# Patient Record
Sex: MALE | NOT HISPANIC OR LATINO | Employment: UNEMPLOYED | URBAN - METROPOLITAN AREA
[De-identification: names, ages, dates, MRNs, and addresses within clinical notes are randomized per-mention and may not be internally consistent; named-entity substitution may affect disease eponyms.]

---
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
## 2022-01-05 DIAGNOSIS — F84.0 AUTISM: ICD-10-CM

## 2022-01-05 DIAGNOSIS — R27.8 COORDINATION ABNORMAL: Primary | ICD-10-CM

## 2022-09-29 ENCOUNTER — OFFICE VISIT (OUTPATIENT)
Dept: PEDIATRICS | Facility: CLINIC | Age: 22
End: 2022-09-29
Payer: MEDICAID

## 2022-09-29 VITALS
DIASTOLIC BLOOD PRESSURE: 67 MMHG | WEIGHT: 141 LBS | BODY MASS INDEX: 20.19 KG/M2 | SYSTOLIC BLOOD PRESSURE: 104 MMHG | TEMPERATURE: 97.6 F | HEART RATE: 72 BPM | HEIGHT: 70 IN

## 2022-09-29 DIAGNOSIS — F84.0 AUTISM: ICD-10-CM

## 2022-09-29 DIAGNOSIS — R26.9 ABNORMAL GAIT: ICD-10-CM

## 2022-09-29 DIAGNOSIS — Z00.129 ENCOUNTER FOR ROUTINE CHILD HEALTH EXAMINATION W/O ABNORMAL FINDINGS: Primary | ICD-10-CM

## 2022-09-29 DIAGNOSIS — J30.1 SEASONAL ALLERGIC RHINITIS DUE TO POLLEN: ICD-10-CM

## 2022-09-29 PROCEDURE — 92551 PURE TONE HEARING TEST AIR: CPT | Performed by: PEDIATRICS

## 2022-09-29 PROCEDURE — 99395 PREV VISIT EST AGE 18-39: CPT | Performed by: PEDIATRICS

## 2022-09-29 RX ORDER — FEXOFENADINE HCL 180 MG/1
180 TABLET ORAL DAILY
Qty: 90 TABLET | Refills: 1 | Status: SHIPPED | OUTPATIENT
Start: 2022-09-29 | End: 2022-12-08

## 2022-09-29 SDOH — ECONOMIC STABILITY: TRANSPORTATION INSECURITY
IN THE PAST 12 MONTHS, HAS THE LACK OF TRANSPORTATION KEPT YOU FROM MEDICAL APPOINTMENTS OR FROM GETTING MEDICATIONS?: NO

## 2022-09-29 SDOH — ECONOMIC STABILITY: FOOD INSECURITY: WITHIN THE PAST 12 MONTHS, YOU WORRIED THAT YOUR FOOD WOULD RUN OUT BEFORE YOU GOT MONEY TO BUY MORE.: SOMETIMES TRUE

## 2022-09-29 SDOH — ECONOMIC STABILITY: FOOD INSECURITY: WITHIN THE PAST 12 MONTHS, THE FOOD YOU BOUGHT JUST DIDN'T LAST AND YOU DIDN'T HAVE MONEY TO GET MORE.: NEVER TRUE

## 2022-09-29 SDOH — ECONOMIC STABILITY: INCOME INSECURITY: IN THE LAST 12 MONTHS, WAS THERE A TIME WHEN YOU WERE NOT ABLE TO PAY THE MORTGAGE OR RENT ON TIME?: PATIENT REFUSED

## 2022-09-29 NOTE — PROGRESS NOTES
Preventive Care Visit  [facility]  Ethan Swain MD, Pediatrics  Sep 29, 2022    Assessment & Plan   22 year old, here for preventive care.    Nicolle was seen today for well child and health maintenance.    Diagnoses and all orders for this visit:    Encounter for routine child health examination w/o abnormal findings  -     Cancel: BEHAVIORAL/EMOTIONAL ASSESSMENT (28034)  -     SCREENING TEST, PURE TONE, AIR ONLY    Autism    Abnormal gait    Seasonal allergic rhinitis due to pollen  -     fexofenadine (ALLEGRA) 180 MG tablet; Take 1 tablet (180 mg) by mouth daily    doing well. Independent but supported in decision making with mother. Attending school and working and doing well  rx for allegra as zyrtec makes sleepy  Continue with PT for abnormal gait (likely lack of core strength/deconditioning)  Will need adult provider next year    Patient has been advised of split billing requirements and indicates understanding: Yes  Growth      Normal height and weight    Immunizations   Patient/Parent(s) declined some/all vaccines today.  HPV, covid, influenzaMenB Vaccine not discussed.    Anticipatory Guidance    Reviewed age appropriate anticipatory guidance.   Reviewed Anticipatory Guidance in patient instructions    Cleared for sports:  Not addressed    Referrals/Ongoing Specialty Care  Ongoing care with PT  Verbal Dental Referral: Verbal dental referral was given      Follow Up      Return in 1 year (on 9/29/2023) for Preventive Care visit.    Subjective     No flowsheet data found.  Social 9/29/2022   Lives with Family   Recent potential stressors None   History of trauma No   Family Hx of mental health challenges No   Lack of transportation has limited access to appts/meds No   Difficulty paying mortgage/rent on time Patient refused   Lack of steady place to sleep/has slept in a shelter No   (!) HOUSING CONCERN PRESENT  Health Risks/Safety 9/29/2022   Do you always wear a seat belt? Yes    Helmet use? (!) NO        TB Screening: Consider immunosuppression as a risk factor for TB 9/29/2022   Recent TB infection or positive TB test in family/close contacts No   Recent travel outside [place] (you/family/close contacts) (!) YES   Which country? [place]   For how long?  9 Month s   Recent residence in high-risk group setting (correctional facility/health care facility/homeless shelter/refugee camp) No     Recent Labs   Lab Test 07/30/20  0805 07/15/20  1411   CHOL 190 209*   HDL 49 56   * 140*   TRIG 52 65       Diet 9/29/2022   What type of water? (!) BOTTLED   In past 12 months, concerned food might run out Sometimes true   In past 12 months, food has run out/couldn't afford more Never true     Diet 9/29/2022   Do you have questions about your eating?  No   Do you have questions about your weight?  No   What do you regularly drink? Water, Cow's Milk, (!) MILK ALTERNATIVE (E.G. SOY, ALMOND, RIPPLE), (!) JUICE, (!) POP   What type of water? (!) BOTTLED   Do you think you eat healthy foods? Yes   At least 3 servings of food or beverages that have calcium each day? Yes   How would you describe your diet?  No restrictions, (!) FAST FOOD FREQUENTLY   In past 12 months, concerned food might run out Sometimes true   In past 12 months, food has run out/couldn't afford more Never true     (!) FOOD SECURITY CONCERN PRESENT  Activity 9/29/2022   Days per week of moderate/strenuous exercise 2 days   On average, how many minutes do you engage in exercise at this level? (!) 30 MINUTES   What do you do for exercise? At [facility]   What activities are you involved with? Swimming lessons     Media Use 9/29/2022   Hours per day of screen time (for entertainment) A minute     Sleep 9/29/2022   Do you have any trouble with sleep? No     School 9/29/2022   Are you in school? Yes   What school do you attend?  [id number]   What do you do for work? [employer]     Vision/Hearing 9/29/2022   Vision or  "hearing concerns (!) VISION CONCERNS       Psycho-Social/Depression - PSC-17 required for C&TC through age 18  General screening:  No screening tool used  Teen Screen    Teen Screen completed, reviewed and scanned document within chart.         Objective     Exam  /67   Pulse 72   Temp 97.6  F (36.4  C) (Oral)   Ht 1.777 m (5' 9.96\")   Wt 64 kg (141 lb)   BMI 20.25 kg/m    Facility age limit for growth percentiles is 20 years.  Facility age limit for growth percentiles is 20 years.  Facility age limit for growth percentiles is 20 years.  Growth percentile SmartLinks can only be used for patients less than 20 years old.    Vision Screen  Vision Screen Details  Reason Vision Screen Not Completed: Patient had exam in last 12 months    Hearing Screen  RIGHT EAR  1000 Hz on Level 40 dB (Conditioning sound): Pass  1000 Hz on Level 20 dB: Pass  2000 Hz on Level 20 dB: Pass  4000 Hz on Level 20 dB: Pass  6000 Hz on Level 20 dB: Pass  8000 Hz on Level 20 dB: Pass  LEFT EAR  8000 Hz on Level 20 dB: Pass  6000 Hz on Level 20 dB: Pass  4000 Hz on Level 20 dB: Pass  2000 Hz on Level 20 dB: Pass  1000 Hz on Level 20 dB: Pass  500 Hz on Level 25 dB: Pass  RIGHT EAR  500 Hz on Level 25 dB: Pass  Results  Hearing Screen Results: Pass      Physical Exam  GENERAL: Active, alert, in no acute distress.  SKIN: Clear. No significant rash, abnormal pigmentation or lesions  HEAD: Normocephalic  EYES: Pupils equal, round, reactive, Extraocular muscles intact. Normal conjunctivae.  EARS: Normal canals. Tympanic membranes are normal; gray and translucent.  NOSE: Normal without discharge.  MOUTH/THROAT: Clear. No oral lesions. Teeth without obvious abnormalities.  NECK: Supple, no masses.  No thyromegaly.  LYMPH NODES: No adenopathy  LUNGS: Clear. No rales, rhonchi, wheezing or retractions  HEART: Regular rhythm. Normal S1/S2. No murmurs. Normal pulses.  ABDOMEN: Soft, non-tender, not distended, no masses or hepatosplenomegaly. Bowel " sounds normal.   NEUROLOGIC: No focal findings. Cranial nerves grossly intact: DTR's normal. Normal gait, strength and tone  BACK: Spine is straight, no scoliosis.  EXTREMITIES: Full range of motion, no deformities  : Normal male external genitalia. Boris stage 5,  both testes descended, no hernia.       Musculoskeletal    Neck: normal    Back: normal    Shoulder/arm: normal    Elbow/forearm: normal    Wrist/hand/fingers: normal    Hip/thigh: normal    Knee: normal    Leg/ankle: normal    Foot/toes: normal    Functional (Single Leg Hop or Squat): difficult to perform, poor coordinatino      Ethan Swain MD  [facility]

## 2022-09-29 NOTE — PATIENT INSTRUCTIONS
Dr. Shipman ([facility])    [email or web address] [phone]  Patient Education    [employer] HANDOUT- PATIENT  18 THROUGH 21 YEAR VISITS  Here are some suggestions from [employer] experts that may be of value to your family.     HOW YOU ARE DOING  Enjoy spending time with your family.  Find activities you are really interested in, such as sports, theater, or volunteering.  Try to be responsible for your schoolwork or work obligations.  Always talk through problems and never use violence.  If you get angry with someone, try to walk away.  If you feel unsafe in your home or have been hurt by someone, let us know. Hotlines and community agencies can also provide confidential help.  Talk with us if you are worried about your living or food situation. Community agencies and programs such as SNAP can help.  Don t smoke, vape, or use drugs. Avoid people who do when you can. Talk with us if you are worried about alcohol or drug use in your family.    YOUR DAILY LIFE  Visit the dentist at least twice a year.  Brush your teeth at least twice a day and floss once a day.  Be a healthy eater.  Have vegetables, fruits, lean protein, and whole grains at meals and snacks.  Limit fatty, sugary, salty foods that are low in nutrients, such as candy, chips, and ice cream.  Eat when you re hungry. Stop when you feel satisfied.  Eat breakfast.  Drink plenty of water.  Make sure to get enough calcium every day.  Have 3 or more servings of low-fat (1%) or fat-free milk and other low-fat dairy products, such as yogurt and cheese.  Women: Make sure to eat foods rich in folate, such as fortified grains and dark- green leafy vegetables.  Aim for at least 1 hour of physical activity every day.  Wear safety equipment when you play sports.  Get enough sleep.  Talk with us about managing your health care and insurance as an adult.    YOUR FEELINGS  Most people have ups and downs. If you are feeling sad, depressed, nervous, irritable, hopeless, or  angry, let us know or reach out to another health care professional.  Figure out healthy ways to deal with stress.  Try your best to solve problems and make decisions on your own.  Sexuality is an important part of your life. If you have any questions or concerns, we are here for you.    HEALTHY BEHAVIOR CHOICES  Avoid using drugs, alcohol, tobacco, steroids, and diet pills. Support friends who choose not to use.  If you use drugs or alcohol, let us know or talk with another trusted adult about it. We can help you with quitting or cutting down on your use.  Make healthy decisions about your sexual behavior.  If you are sexually active, always practice safe sex. Always use birth control along with a condom to prevent pregnancy and sexually transmitted infections.  All sexual activity should be something you want. No one should ever force or try to convince you.  Protect your hearing at work, home, and concerts. Keep your earbud volume down.    STAYING SAFE  Always be a safe and cautious .  Insist that everyone use a lap and shoulder seat belt.  Limit the number of friends in the car and avoid driving at night.  Avoid distractions. Never text or talk on the phone while you drive.  Do not ride in a vehicle with someone who has been using drugs or alcohol.  If you feel unsafe driving or riding with someone, call someone you trust to drive you.  Wear helmets and protective gear while playing sports. Wear a helmet when riding a bike, a motorcycle, or an ATV or when skiing or skateboarding.  Always use sunscreen and a hat when you re outside.  Fighting and carrying weapons can be dangerous. Talk with your parents, teachers, or doctor about how to avoid these situations.        Consistent with Bright Futures: Guidelines for Health Supervision of Infants, Children, and Adolescents, 4th Edition  For more information, go to https://brightfutures.aap.org.

## 2022-09-29 NOTE — LETTER
[place] [id number] [facility], [place] [id number] [phone]    2022      Name: Nicolle Palencia  : 2000  [address] [place] [id number]  [phone] (home) None (work)    Parent/Guardian: Wendy Manzano and Aurelia Pritchett      Date of last physical exam: 22  Are immunizations up to date? Yes  Immunization History   Administered Date(s) Administered     COVID-19,PF,Pfizer (12+ Yrs) 2021, 2021     DTAP (<7y) 2002, 2002, 2002, 2004, 10/27/2006     DTaP, Unspecified 2002, 2002, 2002, 2004, 10/27/2006     Hep B, Peds or Adolescent 2002, 2002, 2002     HepA-Peds, Unspecified 2012, 10/03/2012     HepA-ped 2 Dose 2012, 10/03/2012     MMR 2003, 10/27/2006     MMR/V 2003, 10/27/2006     Meningococcal (Menactra ) 07/15/2020     Meningococcal (Menveo ) 2014     Meningococcal Mcv4 Conjugate,unspecified  07/15/2020     Pedvax-hib 2002, 2002, 2003     Pneumo Conj 13-V (2010&after) 2002, 2002, 2003, 2003     Poliovirus, inactivated (IPV) 2002, 2002, 2002, 10/27/2006     Tdap (Adacel,Boostrix) 2014     Varicella 2003, 2012, 2014       How long have you been seeing this child? Adolescence   How frequently do you see this child when he is not ill? 1x/year  Does this child have any allergies (including allergies to medication)? Peanuts [nuts]  Is a modified diet necessary? Yes: no nuts  Is any condition present that might result in an emergency? Yes: nut allergy  What is the status of the child's Vision? normal for age  What is the status of the child's Hearing? normal for age  What is the status of the child's Speech? normal for age  List of important health problems--indicate if you or another medical source follows:  Autism  Nut allergy  Will any health issues require special  attention at the center?  No  Other information helpful to the  program:           Ethan Swain MD

## 2022-10-04 PROBLEM — R26.9 ABNORMAL GAIT: Status: ACTIVE | Noted: 2022-10-04

## 2022-10-04 PROBLEM — J30.1 SEASONAL ALLERGIC RHINITIS DUE TO POLLEN: Status: ACTIVE | Noted: 2022-10-04

## 2022-12-08 ENCOUNTER — OFFICE VISIT (OUTPATIENT)
Dept: INTERNAL MEDICINE | Facility: CLINIC | Age: 22
End: 2022-12-08
Payer: MEDICAID

## 2022-12-08 VITALS
WEIGHT: 132.1 LBS | DIASTOLIC BLOOD PRESSURE: 56 MMHG | SYSTOLIC BLOOD PRESSURE: 92 MMHG | TEMPERATURE: 98 F | BODY MASS INDEX: 18.91 KG/M2 | RESPIRATION RATE: 16 BRPM | HEIGHT: 70 IN | HEART RATE: 88 BPM | OXYGEN SATURATION: 98 %

## 2022-12-08 DIAGNOSIS — R11.0 NAUSEA: ICD-10-CM

## 2022-12-08 DIAGNOSIS — R19.8 GAGGING EPISODE: ICD-10-CM

## 2022-12-08 DIAGNOSIS — J30.1 SEASONAL ALLERGIC RHINITIS DUE TO POLLEN: ICD-10-CM

## 2022-12-08 DIAGNOSIS — J02.9 SORE THROAT: Primary | ICD-10-CM

## 2022-12-08 LAB
DEPRECATED S PYO AG THROAT QL EIA: NEGATIVE
GROUP A STREP BY PCR: NOT DETECTED
SARS-COV-2 RNA RESP QL NAA+PROBE: NEGATIVE

## 2022-12-08 PROCEDURE — 99214 OFFICE O/P EST MOD 30 MIN: CPT | Performed by: INTERNAL MEDICINE

## 2022-12-08 PROCEDURE — U0005 INFEC AGEN DETEC AMPLI PROBE: HCPCS | Performed by: INTERNAL MEDICINE

## 2022-12-08 PROCEDURE — 87651 STREP A DNA AMP PROBE: CPT | Performed by: INTERNAL MEDICINE

## 2022-12-08 PROCEDURE — U0003 INFECTIOUS AGENT DETECTION BY NUCLEIC ACID (DNA OR RNA); SEVERE ACUTE RESPIRATORY SYNDROME CORONAVIRUS 2 (SARS-COV-2) (CORONAVIRUS DISEASE [COVID-19]), AMPLIFIED PROBE TECHNIQUE, MAKING USE OF HIGH THROUGHPUT TECHNOLOGIES AS DESCRIBED BY CMS-2020-01-R: HCPCS | Performed by: INTERNAL MEDICINE

## 2022-12-08 RX ORDER — CETIRIZINE HYDROCHLORIDE 10 MG/1
10 TABLET ORAL DAILY
Qty: 30 TABLET | Refills: 11 | Status: SHIPPED | OUTPATIENT
Start: 2022-12-08

## 2022-12-08 NOTE — PATIENT INSTRUCTIONS
Take omeprazole/Prilosec every morning before breakfast for at least 2 weeks.  If symptoms are partially better, continue for a total of 6 weeks.

## 2022-12-08 NOTE — PROGRESS NOTES
"  Assessment & Plan     Sore throat with nausea and gagging  22-year-old male with autism who is a challenging historian.  Mother is present who is somewhat helpful.  He is describing soreness in his throat for the past 1 to 2 weeks but primarily experiencing trouble with \"gagging\" and nausea/emesis.  This is occurring often in the morning and is associated with eating.  Denies any abdominal pain.  No fevers or chills.  Not related to any specific food.  He does not drink coffee nor drink significant amounts of soda.  Non-smoker.  He does not use NSAIDs.  Exam unremarkable including oropharynx and neck without lymphadenopathy or masses.  It is hard to come up with a diagnosis given challenge of obtaining a history that makes any sense.  Low suspicion that this is infectious although as a precaution we will check rapid strep and screen for COVID.  This is sounding more like acid reflux including atypical GERD.  I am suggesting that he start omeprazole 20 mg every morning before breakfast for at least 2 weeks and if symptoms are partially better he should continue for a total of 6 weeks.  - Streptococcus A Rapid Screen w/Reflex to PCR - Clinic Collect  - Symptomatic COVID-19 Virus (Coronavirus) by PCR Nasopharyngeal; Future      - omeprazole (PRILOSEC) 20 MG DR capsule; Take 1 capsule (20 mg) by mouth daily before breakfast        Seasonal allergic rhinitis due to pollen  He does have chronic problems with allergies and we will send a prescription for Zyrtec to his pharmacy  - cetirizine (ZYRTEC) 10 MG tablet; Take 1 tablet (10 mg) by mouth daily                 Return if symptoms worsen or fail to improve.    Vasiliy Tom MD  [facility]    Barbara Valverde is a 22 year old, presenting for the following health issues:  Vomiting (X 11/28 about 4 times a day, feels a gaging sensation in his throat before he vomits )  See assessment and plan for details      Review of Systems " "  No abdominal pain.  No fevers or chills      Objective    BP 92/56 (BP Location: Right arm, Patient Position: Sitting, Cuff Size: Adult Regular)   Pulse 88   Temp 98  F (36.7  C) (Oral)   Resp 16   Ht 1.778 m (5' 10\")   Wt 59.9 kg (132 lb 1.6 oz)   SpO2 98%   BMI 18.95 kg/m    Body mass index is 18.95 kg/m .  Physical Exam   HEENT normal.  Oropharynx normal.  Neck without lymphadenopathy or masses.  No thyromegaly  Abdomen soft without hepatosplenomegaly masses or tenderness            "

## 2022-12-09 ENCOUNTER — TELEPHONE (OUTPATIENT)
Dept: FAMILY MEDICINE | Facility: CLINIC | Age: 22
End: 2022-12-09

## 2022-12-09 NOTE — TELEPHONE ENCOUNTER
"Using [place]  # [id number]    Unable to get a hold of mother via the two number listed for her.    Called father, Wendy, and he understands English well,  not translating while father on the line. Father stated that he is away from home at this time and will have pt's mother return call to the clinic at [phone].    Please let mother, Aurelia, know what per Dr Tom:  \"Notify mother that his COVID and strep test were negative.  I had low suspicion for any infection I think omeprazole for acid reflux is the best treatment strategy.\"    Per Office visit notes from yesterday:  \"This is sounding more like acid reflux including atypical GERD.  I am suggesting that he start omeprazole 20 mg every morning before breakfast for at least 2 weeks and if symptoms are partially better he should continue for a total of 6 weeks.\"    Ramon Reyes, CARLOSN, RN  [facility]      "

## 2022-12-11 ENCOUNTER — DOCUMENTATION ONLY (OUTPATIENT)
Dept: OTHER | Facility: CLINIC | Age: 22
End: 2022-12-11

## 2022-12-15 NOTE — TELEPHONE ENCOUNTER
Left message to return call for father (Wendy) to call back and provide a current phone number for mother (Aurelia).     Last week writer informed father of info, but father stated that he will have mother call clinic so nurse can relay message to mother as well. Still no return call from mother.    Ramon Reyes, CARLOSN, RN  [facility]

## 2022-12-16 NOTE — TELEPHONE ENCOUNTER
Mom Aurelia calling re: 12/8 test results. Advised that COVID and strep came back negative.   FNA read Dr. Tom's message below, medications sent to [facility].    Caller verbalized understanding.    Jaycee Winston RN/[place] [facility] Advisor